# Patient Record
Sex: MALE | Race: ASIAN | NOT HISPANIC OR LATINO
[De-identification: names, ages, dates, MRNs, and addresses within clinical notes are randomized per-mention and may not be internally consistent; named-entity substitution may affect disease eponyms.]

---

## 2019-04-14 ENCOUNTER — APPOINTMENT (OUTPATIENT)
Dept: HUMAN REPRODUCTION | Facility: CLINIC | Age: 34
End: 2019-04-14

## 2019-04-20 ENCOUNTER — APPOINTMENT (OUTPATIENT)
Dept: HUMAN REPRODUCTION | Facility: CLINIC | Age: 34
End: 2019-04-20
Payer: SELF-PAY

## 2019-04-20 PROCEDURE — 89322 SEMEN ANAL STRICT CRITERIA: CPT

## 2021-01-28 ENCOUNTER — APPOINTMENT (OUTPATIENT)
Dept: ORTHOPEDIC SURGERY | Facility: CLINIC | Age: 36
End: 2021-01-28
Payer: MEDICAID

## 2021-01-28 VITALS — WEIGHT: 209 LBS | HEIGHT: 70 IN | BODY MASS INDEX: 29.92 KG/M2

## 2021-01-28 VITALS — SYSTOLIC BLOOD PRESSURE: 133 MMHG | DIASTOLIC BLOOD PRESSURE: 79 MMHG | HEART RATE: 71 BPM

## 2021-01-28 VITALS — TEMPERATURE: 97.2 F

## 2021-01-28 DIAGNOSIS — F17.200 NICOTINE DEPENDENCE, UNSPECIFIED, UNCOMPLICATED: ICD-10-CM

## 2021-01-28 DIAGNOSIS — Z78.9 OTHER SPECIFIED HEALTH STATUS: ICD-10-CM

## 2021-01-28 DIAGNOSIS — Z56.0 UNEMPLOYMENT, UNSPECIFIED: ICD-10-CM

## 2021-01-28 PROCEDURE — 99072 ADDL SUPL MATRL&STAF TM PHE: CPT

## 2021-01-28 PROCEDURE — 99203 OFFICE O/P NEW LOW 30 MIN: CPT

## 2021-01-28 PROCEDURE — 73030 X-RAY EXAM OF SHOULDER: CPT | Mod: RT

## 2021-01-28 SDOH — ECONOMIC STABILITY - INCOME SECURITY: UNEMPLOYMENT, UNSPECIFIED: Z56.0
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
## 2021-01-29 NOTE — DISCUSSION/SUMMARY
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  X-rays taken today show a locked posterior fracture dislocation with avulsion of the lesser tuberosity anteriorly.  CT shows early bridging callus formation at the inferolateral margin with continued posterior shoulder dislocation. Clinically, the shoulder is completely nonfunctional with complete loss of motion/function.  Neurovascularly intact.  I discussed in detail with the patient as well as a friend present who is translating that his injury will likely result in significant long-term disability.  However, given the dislocation, attempts at reduction and fixation of the lesser tuberosity fracture for stabilization is indicated.  \par \par All risks, benefits and alternatives to the proposed surgical procedure, right shoulder open reduction and stabilization with internal fixation lesser tuberosity fixation, as well as the need for formal post-operative rehabilitation were discussed in great detail with the patient. Risks include but are not limited to pain, bleeding, infection, neurovascular injury, stiffness, medical complications (including DVT, PE, MI), and risks of anesthesia. \par \par A discussion was also had with the patient regarding additional risk given recent Covid-19 pandemic; including the potential additional risk of anesthesia and any medical comorbidities that the patient has.  It was made clear to the patient that we are taking all precautions regarding Covid-19 with regards to surgical scheduling and perioperative care.  The patient understands that current protocol requires Covid-19 testing no more than 48hrs prior to surgery, and PST's may be performed onsite at the day of surgery. \par \par Patient questions and concerns were answered. He has elected to proceed and will schedule urgently.

## 2021-01-29 NOTE — PHYSICAL EXAM
[de-identified] : Oriented to time, place, person\par Mood: Normal\par Affect: Normal\par Appearance: Healthy, well appearing, no acute distress.\par Gait: Normal\par Assistive Devices: Sling\par \par RIght shoulder exam:\par \par Inspection: No malalignment, deformity to shoulder consistent with posterior dislocation\par Skin: No masses, No lesions\par Neck: Negative Spurling, full ROM, no pain with ROM\par AROM: Severely limited: FF to 10, abduction to 20, ER to 0\par PROM: No gross passive motion\par Painful arc ROM: throughout\par Tenderness: No bicipital tenderness, positive tenderness to greater tuberosity/RTC insertion, positive anterior shoulder/lesser tuberosity tenderness\par Strength: 2/5 ER, 2/5 IR in adduction\par AC joint: No TTP/pain with cross arm testing\par Vasc: 2+ radial pulse \par Stability: Posterior dislocation\par Neuro: AIN, PIN, Ulnar nerve intact to motor\par Sensation: Intact to light touch throughout  [de-identified] : The following radiographs were ordered and read by me during this patients visit. I reviewed each radiograph in detail with the patient and discussed the findings as highlighted below.\par \par 3 views of right shoulder were obtained today, 01/28/2021, that show locked posterior shoulder dislocation and anterior displaced lesser tuberosity fracture. \par \par CT of right shoulder dated 1.20.2021 shows comminuted fx of the humeral head with early bridging callus formation at the inferolateral margin.  Locked posterior shoulder dislocation with complete disarticulation of the dominant distal fracture fragment.

## 2021-01-29 NOTE — ADDENDUM
[FreeTextEntry1] : This note was written by Maria Eugenia Alexander on 01/28/2021 acting solely as a scribe for Dr. Jerome Mccarthy.\par \par All medical record entries made by the Scribe were at my, Dr. Jerome Mccarthy, direction and personally dictated by me on 01/28/2021. I have personally reviewed the chart and agree that the record accurately reflects my personal performance of the history, physical exam, assessment and plan.

## 2021-02-02 ENCOUNTER — OUTPATIENT (OUTPATIENT)
Dept: OUTPATIENT SERVICES | Facility: HOSPITAL | Age: 36
LOS: 1 days | End: 2021-02-02
Payer: MEDICAID

## 2021-02-02 VITALS
OXYGEN SATURATION: 99 % | WEIGHT: 195.99 LBS | RESPIRATION RATE: 16 BRPM | DIASTOLIC BLOOD PRESSURE: 88 MMHG | TEMPERATURE: 98 F | HEIGHT: 70 IN | HEART RATE: 87 BPM | SYSTOLIC BLOOD PRESSURE: 118 MMHG

## 2021-02-02 DIAGNOSIS — S42.291A OTHER DISPLACED FRACTURE OF UPPER END OF RIGHT HUMERUS, INITIAL ENCOUNTER FOR CLOSED FRACTURE: ICD-10-CM

## 2021-02-02 LAB
ANION GAP SERPL CALC-SCNC: 13 MMOL/L — SIGNIFICANT CHANGE UP (ref 7–14)
BLD GP AB SCN SERPL QL: NEGATIVE — SIGNIFICANT CHANGE UP
BUN SERPL-MCNC: 10 MG/DL — SIGNIFICANT CHANGE UP (ref 7–23)
CALCIUM SERPL-MCNC: 9.7 MG/DL — SIGNIFICANT CHANGE UP (ref 8.4–10.5)
CHLORIDE SERPL-SCNC: 101 MMOL/L — SIGNIFICANT CHANGE UP (ref 98–107)
CO2 SERPL-SCNC: 25 MMOL/L — SIGNIFICANT CHANGE UP (ref 22–31)
CREAT SERPL-MCNC: 0.53 MG/DL — SIGNIFICANT CHANGE UP (ref 0.5–1.3)
GLUCOSE SERPL-MCNC: 87 MG/DL — SIGNIFICANT CHANGE UP (ref 70–99)
HCT VFR BLD CALC: 49.7 % — SIGNIFICANT CHANGE UP (ref 39–50)
HGB BLD-MCNC: 16.1 G/DL — SIGNIFICANT CHANGE UP (ref 13–17)
MCHC RBC-ENTMCNC: 27.1 PG — SIGNIFICANT CHANGE UP (ref 27–34)
MCHC RBC-ENTMCNC: 32.4 GM/DL — SIGNIFICANT CHANGE UP (ref 32–36)
MCV RBC AUTO: 83.7 FL — SIGNIFICANT CHANGE UP (ref 80–100)
NRBC # BLD: 0 /100 WBCS — SIGNIFICANT CHANGE UP
NRBC # FLD: 0 K/UL — SIGNIFICANT CHANGE UP
PLATELET # BLD AUTO: 216 K/UL — SIGNIFICANT CHANGE UP (ref 150–400)
POTASSIUM SERPL-MCNC: 3.6 MMOL/L — SIGNIFICANT CHANGE UP (ref 3.5–5.3)
POTASSIUM SERPL-SCNC: 3.6 MMOL/L — SIGNIFICANT CHANGE UP (ref 3.5–5.3)
RBC # BLD: 5.94 M/UL — HIGH (ref 4.2–5.8)
RBC # FLD: 12.7 % — SIGNIFICANT CHANGE UP (ref 10.3–14.5)
RH IG SCN BLD-IMP: POSITIVE — SIGNIFICANT CHANGE UP
SODIUM SERPL-SCNC: 139 MMOL/L — SIGNIFICANT CHANGE UP (ref 135–145)
WBC # BLD: 7.92 K/UL — SIGNIFICANT CHANGE UP (ref 3.8–10.5)
WBC # FLD AUTO: 7.92 K/UL — SIGNIFICANT CHANGE UP (ref 3.8–10.5)

## 2021-02-02 PROCEDURE — 93010 ELECTROCARDIOGRAM REPORT: CPT
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
## 2021-02-02 NOTE — H&P PST ADULT - NEGATIVE NEUROLOGICAL SYMPTOMS
no transient paralysis/no weakness/no paresthesias/no generalized seizures/no focal seizures/no tremors/no difficulty walking

## 2021-02-02 NOTE — H&P PST ADULT - NSICDXPROBLEM_GEN_ALL_CORE_FT
PROBLEM DIAGNOSES  Problem: Other displaced fracture of upper end of right humerus, initial encounter for closed fracture  Assessment and Plan: Patient tentatively scheduled for right shoulder open reduction internal fixation proximal humerus fracture open reduction shoulder dislocation on 2/9/21.  Pre-op instructions provided. Pt given verbal and written instructions with teach back on chlorhexidine shampoo and pepcid. Pt verbalized understanding with return demonstration.   Covid testing scheduled prior to surgery as per patient.

## 2021-02-02 NOTE — H&P PST ADULT - NEGATIVE GENERAL GENITOURINARY SYMPTOMS
no hematuria/no renal colic/no flank pain L/no flank pain R/no urine discoloration/no bladder infections/no dysuria/no urinary hesitancy/normal urinary frequency

## 2021-02-04 DIAGNOSIS — Z01.818 ENCOUNTER FOR OTHER PREPROCEDURAL EXAMINATION: ICD-10-CM

## 2021-02-05 ENCOUNTER — APPOINTMENT (OUTPATIENT)
Dept: DISASTER EMERGENCY | Facility: CLINIC | Age: 36
End: 2021-02-05

## 2021-02-06 LAB — SARS-COV-2 N GENE NPH QL NAA+PROBE: NOT DETECTED

## 2021-02-08 ENCOUNTER — TRANSCRIPTION ENCOUNTER (OUTPATIENT)
Age: 36
End: 2021-02-08

## 2021-02-08 VITALS
SYSTOLIC BLOOD PRESSURE: 118 MMHG | OXYGEN SATURATION: 99 % | HEIGHT: 70 IN | TEMPERATURE: 98 F | RESPIRATION RATE: 16 BRPM | HEART RATE: 87 BPM | DIASTOLIC BLOOD PRESSURE: 88 MMHG | WEIGHT: 195.99 LBS

## 2021-02-08 RX ORDER — OXYCODONE AND ACETAMINOPHEN 5; 325 MG/1; MG/1
5-325 TABLET ORAL
Qty: 30 | Refills: 0 | Status: ACTIVE | COMMUNITY
Start: 2021-02-08 | End: 1900-01-01

## 2021-02-09 ENCOUNTER — OUTPATIENT (OUTPATIENT)
Dept: OUTPATIENT SERVICES | Facility: HOSPITAL | Age: 36
LOS: 1 days | Discharge: HOME | End: 2021-02-09
Payer: MEDICAID

## 2021-02-09 ENCOUNTER — APPOINTMENT (OUTPATIENT)
Dept: ORTHOPEDIC SURGERY | Facility: AMBULATORY SURGERY CENTER | Age: 36
End: 2021-02-09

## 2021-02-09 VITALS
DIASTOLIC BLOOD PRESSURE: 69 MMHG | HEART RATE: 80 BPM | RESPIRATION RATE: 15 BRPM | SYSTOLIC BLOOD PRESSURE: 114 MMHG | TEMPERATURE: 97 F | OXYGEN SATURATION: 100 %

## 2021-02-09 DIAGNOSIS — S42.291A OTHER DISPLACED FRACTURE OF UPPER END OF RIGHT HUMERUS, INITIAL ENCOUNTER FOR CLOSED FRACTURE: ICD-10-CM

## 2021-02-09 PROCEDURE — 23670 OPTX SHO DISLC FX: CPT | Mod: RT

## 2021-02-09 PROCEDURE — 23430 REPAIR BICEPS TENDON: CPT | Mod: RT

## 2021-02-09 NOTE — ASU DISCHARGE PLAN (ADULT/PEDIATRIC) - CALL YOUR DOCTOR IF YOU HAVE ANY OF THE FOLLOWING:
Bleeding that does not stop/Swelling that gets worse/Pain not relieved by Medications/Fever greater than (need to indicate Fahrenheit or Celsius)/Numbness, tingling, color or temperature change to extremity/Nausea and vomiting that does not stop

## 2021-02-22 ENCOUNTER — APPOINTMENT (OUTPATIENT)
Dept: ORTHOPEDIC SURGERY | Facility: CLINIC | Age: 36
End: 2021-02-22
Payer: MEDICAID

## 2021-02-22 PROCEDURE — 73030 X-RAY EXAM OF SHOULDER: CPT | Mod: RT

## 2021-02-22 PROCEDURE — 99024 POSTOP FOLLOW-UP VISIT: CPT

## 2021-02-22 NOTE — ADDENDUM
[FreeTextEntry1] : This note was written by Maria Eugenia Alexander on 02/22/2021 acting solely as a scribe for Dr. Jerome Mccarthy.\par \par All medical record entries made by the Scribe were at my, Dr. Jerome Mccarthy, direction and personally dictated by me on 02/22/2021. I have personally reviewed the chart and agree that the record accurately reflects my personal performance of the history, physical exam, assessment and plan.

## 2021-02-22 NOTE — HISTORY OF PRESENT ILLNESS
[Clean/Dry/Intact] : clean, dry and intact [Healed] : healed [Neuro Intact] : an unremarkable neurological exam [Vascular Intact] : ~T peripheral vascular exam normal [Doing Well] : is doing well [Excellent Pain Control] : has excellent pain control [No Sign of Infection] : is showing no signs of infection [Sutures Removed] : sutures were removed [Steri-Strips Removed & Replaced] : steri-strips removed and replaced [Chills] : no chills [Fever] : no fever [Nausea] : no nausea [Vomiting] : no vomiting [Erythema] : not erythematous [Discharge] : absent of discharge [Swelling] : not swollen [Dehiscence] : not dehisced [de-identified] : 34 y/o male s/p right shoulder open reduction posterior shoulder dislocation, ORIF of greater tuberosity and  lesser tuberosity, modified Walden procedure, open biceps tenodesis 2.9.2021 [de-identified] : 36 y/o male s/p right shoulder open reduction posterior shoulder dislocation, ORIF of greater tuberosity and  lesser tuberosity, modified Walden procedure, open biceps tenodesis . He is doing well. Presents in brace. He is not taking pain medication. Denies post op complications.  [de-identified] : Right shoulder exam:\par \par Inspection: No significant ecchymosis, no residual swelling\par Skin: Incisions C/D/I, no drainage, healed\par ROM: not tested \par Painful arc ROM: not tested\par Tenderness: Tenderness throughout the shoulder girdle\par Strength: Unable to test\par Vasc: 2+ radial pulse \par Neuro: AIN, PIN, Ulnar nerve intact to motor\par Sensation: Intact to light touch throughout  [de-identified] : The following radiographs were ordered and read by me during this patients visit. I reviewed each radiograph in detail with the patient and discussed the findings as highlighted below.\par \par 2 views of right shoulder were obtained today, 02/22/2021, that show post-operative ORIF greater tuberosity and lesser tuberosity. No hardware failure.  Near concentric reduction right shoulder.  Axillary image not obtained. [de-identified] : 36 y/o male s/p right shoulder open reduction posterior shoulder dislocation, ORIF of greater tuberosity and  lesser tuberosity, modified Walden procedure, open biceps tenodesis \par \par Patient is doing well. All questions were answered regarding surgical procedure as well as immediate post-operative recovery period. We also discussed the post-operative rehabilitation program in great detail.  Consideration is for continued immobilization for an additional 2 weeks prior to beginning passive range of motion with physical therapy.  Will restrict internal rotation for a total of 6 to 8 weeks.\par \par Recommendations:\par 1.  Complete restriction range of motion function right upper extremity x2 weeks 2. Brace: Discussed use of abduction brace and external rotation, removal for hygeine only 3. Meds: Wean pain medication, may transition to Tylenol/NSAID's as tolerated.\par 4. Ice/cryocuff as needed\par 5. Restrictions: As discussed \par \par Followup 2 weeks for repeat clinical evaluation/imaging.

## 2021-02-23 PROBLEM — Z78.9 OTHER SPECIFIED HEALTH STATUS: Chronic | Status: ACTIVE | Noted: 2021-02-02

## 2021-03-08 ENCOUNTER — APPOINTMENT (OUTPATIENT)
Dept: ORTHOPEDIC SURGERY | Facility: CLINIC | Age: 36
End: 2021-03-08

## 2021-03-08 ENCOUNTER — APPOINTMENT (OUTPATIENT)
Dept: ORTHOPEDIC SURGERY | Facility: CLINIC | Age: 36
End: 2021-03-08
Payer: MEDICAID

## 2021-03-08 PROCEDURE — 99024 POSTOP FOLLOW-UP VISIT: CPT

## 2021-03-10 NOTE — HISTORY OF PRESENT ILLNESS
[Clean/Dry/Intact] : clean, dry and intact [Healed] : healed [Neuro Intact] : an unremarkable neurological exam [Vascular Intact] : ~T peripheral vascular exam normal [Doing Well] : is doing well [Excellent Pain Control] : has excellent pain control [No Sign of Infection] : is showing no signs of infection [Sutures Removed] : sutures were removed [Steri-Strips Removed & Replaced] : steri-strips removed and replaced [Chills] : no chills [Fever] : no fever [Nausea] : no nausea [Vomiting] : no vomiting [Erythema] : not erythematous [Discharge] : absent of discharge [Swelling] : not swollen [Dehiscence] : not dehisced [de-identified] : 34 y/o male s/p right shoulder open reduction posterior shoulder dislocation, ORIF of greater tuberosity and  lesser tuberosity, modified Walden procedure, open biceps tenodesis 2.9.2021 [de-identified] : 34 y/o male s/p right shoulder open reduction posterior shoulder dislocation, ORIF of greater tuberosity and  lesser tuberosity, modified Walden procedure, open biceps tenodesis . He is doing well. Presents in brace. He is not taking pain medication. Denies post op complications.  [de-identified] : Right shoulder exam:\par \par Inspection: No significant ecchymosis, no residual swelling\par Skin: Incisions C/D/I, no drainage, healed\par ROM: not tested \par Painful arc ROM: not tested\par Tenderness: Tenderness throughout the shoulder girdle\par Strength: Unable to test\par Vasc: 2+ radial pulse \par Neuro: AIN, PIN, Ulnar nerve intact to motor\par Sensation: Intact to light touch throughout  [de-identified] : The following radiographs were ordered and read by me during this patients visit. I reviewed each radiograph in detail with the patient and discussed the findings as highlighted below.\par \par 2 views of right shoulder were obtained today, 02/22/2021, that show post-operative ORIF greater tuberosity and lesser tuberosity. No hardware failure.  Near concentric reduction right shoulder.  Axillary image not obtained.

## 2021-04-05 ENCOUNTER — APPOINTMENT (OUTPATIENT)
Dept: ORTHOPEDIC SURGERY | Facility: CLINIC | Age: 36
End: 2021-04-05
Payer: MEDICAID

## 2021-04-05 PROCEDURE — 99024 POSTOP FOLLOW-UP VISIT: CPT

## 2021-04-05 PROCEDURE — 73030 X-RAY EXAM OF SHOULDER: CPT | Mod: RT

## 2021-04-12 NOTE — HISTORY OF PRESENT ILLNESS
[0] : no pain reported [Clean/Dry/Intact] : clean, dry and intact [Healed] : healed [Neuro Intact] : an unremarkable neurological exam [Vascular Intact] : ~T peripheral vascular exam normal [Doing Well] : is doing well [Excellent Pain Control] : has excellent pain control [No Sign of Infection] : is showing no signs of infection [Chills] : no chills [Fever] : no fever [Nausea] : no nausea [Vomiting] : no vomiting [Erythema] : not erythematous [Discharge] : absent of discharge [Swelling] : not swollen [Dehiscence] : not dehisced [de-identified] : 34 y/o male s/p right shoulder open reduction posterior shoulder dislocation, ORIF of greater tuberosity and  lesser tuberosity, modified Walden procedure, open biceps tenodesis 2.9.2021 [de-identified] : 34 y/o male s/p right shoulder open reduction posterior shoulder dislocation, ORIF of greater tuberosity and  lesser tuberosity, modified Walden procedure, open biceps tenodesis . He is doing well. Attending PT 2 x per week.  Presents in brace. He is not taking pain medication. Denies post op complications. Reports stiffness. [de-identified] : Right shoulder exam:\par \par Inspection: No ecchymosis, no swelling\par Skin: Incisions C/D/I, no drainage, healed\par Passive ROM: 10 ER, 50 abd, 70 FF\par Painful arc ROM: extremes of motion\par Tenderness: Tenderness throughout the shoulder girdle with ROM\par Strength: Not tested\par Vasc: 2+ radial pulse \par Neuro: AIN, PIN, Ulnar nerve intact to motor\par Sensation: Intact to light touch throughout  [de-identified] : The following radiographs were ordered and read by me during this patients visit. I reviewed each radiograph in detail with the patient and discussed the findings as highlighted below.\par \par 2 views of right shoulder were obtained today, 04/05/2021, that show post-operative ORIF greater tuberosity and lesser tuberosity. No hardware failure.  Concentric reduction right shoulder. [de-identified] : 34 y/o male s/p right shoulder open reduction posterior shoulder dislocation, ORIF of greater tuberosity and  lesser tuberosity, modified Walden procedure, open biceps tenodesis. \par \par Patient is doing well at this time following surgical intervention. Denies any immediate postoperative complications. Clinically, patient is stiff which is expected due to the severity of the injury. Progressing well with physical therapy and discussed need for aggressive mobilization at this time with expected limitations of function of the shoulder. Discussion was had with the patient/family member regarding the next phase of physical therapy as instructed. \par \par Recommendations:\par 1. Continue PT treatment as per protocol (Updated Rx given). HEP as instructed.\par 2. Brace: Discontinue brace.\par 3. Meds: Tylenol/NSAID's as tolerated.\par 4. Ice as needed after activity/HEP\par 5. Restrictions: None\par \par Followup 8 weeks for repeat clinical evaluation.

## 2021-04-12 NOTE — ADDENDUM
[FreeTextEntry1] : This note was written by Maria Eugenia Alexander on 04/05/2021 acting solely as a scribe for Dr. Jerome Mccarthy.\par \par All medical record entries made by the Scribe were at my, Dr. Jerome Mccarthy, direction and personally dictated by me on 04/05/2021. I have personally reviewed the chart and agree that the record accurately reflects my personal performance of the history, physical exam, assessment and plan.

## 2021-05-18 ENCOUNTER — APPOINTMENT (OUTPATIENT)
Dept: ORTHOPEDIC SURGERY | Facility: CLINIC | Age: 36
End: 2021-05-18
Payer: MEDICAID

## 2021-05-18 PROCEDURE — 99213 OFFICE O/P EST LOW 20 MIN: CPT

## 2021-05-20 NOTE — PHYSICAL EXAM
[de-identified] : Oriented to time, place, person\par Mood: Normal\par Affect: Normal\par Appearance: Healthy, well appearing, no acute distress.\par Gait: Normal\par Assistive Devices: None \par \par Right shoulder exam:\par \par Inspection: No ecchymosis, no swelling\par Skin: Incisions C/D/I, no drainage, healed\par AROM: Minimal\par Passive ROM: 10 ER, 65 abd, 90 FF\par Painful arc ROM: None\par Tenderness: none\par Strength: Weak external rotation\par Vasc: 2+ radial pulse \par Neuro: AIN, PIN, Ulnar nerve intact to motor\par Sensation: Intact to light touch throughout.  [de-identified] : The following radiographs were ordered and read by me during this patients visit. I reviewed each radiograph in detail with the patient and discussed the findings as highlighted below.\par \par 2 views of right shoulder were obtained today, 05/18/2021, that show post-operative ORIF greater tuberosity and lesser tuberosity. No hardware failure. Concentric reduction right shoulder.

## 2021-05-20 NOTE — ADDENDUM
[FreeTextEntry1] : This note was written by Maria Eugenia Alexander on 05/18/2021 acting solely as a scribe for Dr. Jerome Mccarthy.\par \par All medical record entries made by the Scribe were at my, Dr. Jerome Mccarthy, direction and personally dictated by me on 05/18/2021. I have personally reviewed the chart and agree that the record accurately reflects my personal performance of the history, physical exam, assessment and plan.

## 2021-05-20 NOTE — DISCUSSION/SUMMARY
[de-identified] : 35 y/o male s/p right shoulder open reduction posterior shoulder dislocation, ORIF of greater tuberosity and lesser tuberosity, modified Walden procedure, open biceps tenodesis. \par \par Patient is continuing to improve in terms of pain management of the right shoulder, however he has difficulty with active range of motion of the right shoulder.  Passive range of motion is to approximately 90 degrees forward flexion which I discussed would be adequate following this injury.  Recommendations are for aggressive PT for improvement of active ROM as well as strengthening and conditioning. \par \par Recommendations: Continue aggressive PT. NSAIDs/Ice as needed. \par \par Follow-up 2 months

## 2021-05-20 NOTE — HISTORY OF PRESENT ILLNESS
[de-identified] : 35 y/o male s/p right shoulder open reduction posterior shoulder dislocation, ORIF of greater tuberosity and lesser tuberosity, modified Walden procedure, open biceps tenodesis 2.9.21. He is doing well, with improved pain and function, but continues to have limited active motion. Attending PT 2 x per week.He is not taking pain medication. Denies post op complications. Rates his pain 0/10. He is not taking pain medication.

## 2021-07-29 ENCOUNTER — APPOINTMENT (OUTPATIENT)
Dept: ORTHOPEDIC SURGERY | Facility: CLINIC | Age: 36
End: 2021-07-29
Payer: MEDICAID

## 2021-07-29 DIAGNOSIS — Z00.00 ENCOUNTER FOR GENERAL ADULT MEDICAL EXAMINATION W/OUT ABNORMAL FINDINGS: ICD-10-CM

## 2021-07-29 DIAGNOSIS — S43.021A POSTERIOR SUBLUXATION OF RIGHT HUMERUS, INITIAL ENCOUNTER: ICD-10-CM

## 2021-07-29 PROCEDURE — 99213 OFFICE O/P EST LOW 20 MIN: CPT

## 2021-07-29 PROCEDURE — 73030 X-RAY EXAM OF SHOULDER: CPT | Mod: RT

## 2021-07-30 PROBLEM — S43.021A: Status: ACTIVE | Noted: 2021-01-29

## 2021-07-30 PROBLEM — Z00.00 ENCOUNTER FOR PREVENTIVE HEALTH EXAMINATION: Status: ACTIVE | Noted: 2019-04-13

## 2021-07-30 NOTE — DISCUSSION/SUMMARY
[de-identified] : 37 y/o male s/p right shoulder open reduction posterior shoulder dislocation, ORIF of greater tuberosity and lesser tuberosity, modified Walden procedure, open biceps tenodesis. \par \par Patient is continuing to improve in terms of pain management of the right shoulder, however he has continued difficulty with active range of motion of the right shoulder.  Active range of motion is to approximately 40 degrees forward flexion.  There is concerned given the degree of injury that his rotator cuff is not functioning naturally and well.  This was discussed with the patient.  We discussed continued limitations of physical therapy given initial injury patten, and that persistent physical therapy may ultimately provide a functional shoulder.  \par \par Recommendations: Continue aggressive PT, new Rx given. NSAIDs/Ice as needed. \par \par Follow-up 2 months for consideration of advanced imaging if continued rotator cuff dysfunction.

## 2021-07-30 NOTE — ADDENDUM
[FreeTextEntry1] : This note was written by Maria Eugenia Alexander on 07/29/2021 acting solely as a scribe for Dr. Jerome Mccarthy.\par \par All medical record entries made by the Scribe were at my, Dr. Jerome Mccarthy, direction and personally dictated by me on 07/29/2021. I have personally reviewed the chart and agree that the record accurately reflects my personal performance of the history, physical exam, assessment and plan.

## 2021-07-30 NOTE — PHYSICAL EXAM
[de-identified] : Oriented to time, place, person\par Mood: Normal\par Affect: Normal\par Appearance: Healthy, well appearing, no acute distress.\par Gait: Normal\par Assistive Devices: None \par \par Right shoulder exam:\par \par Inspection: No ecchymosis, no swelling, no deformity\par Skin: Incisions C/D/I, no drainage, healed\par AROM: 10 ER, 40 abd, 50 FF\par PROM: 110 FF, 60 abd, 15 ER\par Painful arc ROM: None\par Tenderness: none\par Strength: 3/5 ER, 5/5 IR, +drop arm test\par Vasc: 2+ radial pulse \par Neuro: AIN, PIN, Ulnar nerve intact to motor\par Sensation: Intact to light touch throughout.  [de-identified] : The following radiographs were ordered and read by me during this patients visit. I reviewed each radiograph in detail with the patient and discussed the findings as highlighted below.\par \par 2 views of right shoulder were obtained today, 07/18/2021, that show post-operative ORIF greater tuberosity and lesser tuberosity. No hardware failure. Concentric reduction right shoulder.

## 2021-07-30 NOTE — HISTORY OF PRESENT ILLNESS
[de-identified] : 35 y/o male s/p right shoulder open reduction posterior shoulder dislocation, ORIF of greater tuberosity and lesser tuberosity, modified Walden procedure, open biceps tenodesis 2.9.21. He is doing well, with improved pain, but continues to have limited active motion. Attending PT 2 x per week. He is not taking pain medication. Denies post op complications. Rates his pain 0/10. He is not taking pain medication.

## 2021-11-01 ENCOUNTER — APPOINTMENT (OUTPATIENT)
Dept: ORTHOPEDIC SURGERY | Facility: CLINIC | Age: 36
End: 2021-11-01
Payer: MEDICAID

## 2021-11-01 VITALS — HEIGHT: 70 IN | BODY MASS INDEX: 29.92 KG/M2 | WEIGHT: 209 LBS

## 2021-11-01 DIAGNOSIS — S42.291D OTHER DISPLACED FRACTURE OF UPPER END OF RIGHT HUMERUS, SUBSEQUENT ENCOUNTER FOR FRACTURE WITH ROUTINE HEALING: ICD-10-CM

## 2021-11-01 PROCEDURE — 99213 OFFICE O/P EST LOW 20 MIN: CPT

## 2021-11-02 PROBLEM — S42.291D OTHER CLOSED DISPLACED FRACTURE OF PROXIMAL END OF RIGHT HUMERUS WITH ROUTINE HEALING, SUBSEQUENT ENCOUNTER: Status: ACTIVE | Noted: 2021-01-29

## 2021-11-02 NOTE — PHYSICAL EXAM
[de-identified] : Oriented to time, place, person\par Mood: Normal\par Affect: Normal\par Appearance: Healthy, well appearing, no acute distress.\par Gait: Normal\par Assistive Devices: None \par \par Right shoulder exam:\par \par Inspection: No ecchymosis, no swelling, no deformity\par Skin: Incisions C/D/I, no drainage, healed\par AROM: 80 FF, 70 abd, 0 ER, IR to mid lumbar\par PROM: 120 FF, 60 abd, 15 ER\par Painful arc ROM: None\par Tenderness: none\par Strength: 2/5 ER, 5/5 IR, +drop arm test\par Vasc: 2+ radial pulse \par Neuro: AIN, PIN, Ulnar nerve intact to motor\par Sensation: Intact to light touch throughout.  [de-identified] : 2 views of right shoulder were obtained 07/18/2021, that show post-operative ORIF greater tuberosity and lesser tuberosity. No hardware failure. Concentric reduction right shoulder.

## 2021-11-02 NOTE — HISTORY OF PRESENT ILLNESS
[de-identified] : 35 y/o male s/p right shoulder open reduction posterior shoulder dislocation, ORIF of greater tuberosity and lesser tuberosity, modified Walden procedure, open biceps tenodesis 2.9.21. He discontinued PT 3 months ago due to lack of insurance authorization and continues to struggle with external rotation/forward flexion consistent with rotator cuff deficiency.  We have previously discussed concern for long-term dysfunction of the shoulder, but he is concerned regarding lack of function.  He is not taking pain medication. Denies post op complications.

## 2021-11-02 NOTE — DISCUSSION/SUMMARY
[de-identified] : 37 y/o male s/p right shoulder open reduction posterior shoulder dislocation, ORIF of greater tuberosity and lesser tuberosity, modified Walden procedure, open biceps tenodesis. \par \par Patient has continued difficulty with active range of motion of the right shoulder including external rotation and forward flexion.  Consistent with rotator cuff deficiency.  We discussed degree of internal derangement at time of procedure, and likely continued rotator cuff deficiency through the posterior shoulder. He has also not been able to continue with PT due to lack of insurance authorization. \par \par MRI Rx given to patient to further delineate condition of rotator cuff and any posterior cuff deficiency. This will allow for better understanding of the patient's condition and allow for better stratification of additional treatment strategies (surgical vs. non-surgical). Patient is agreeable to further imaging. \par \par Recommendations: Continued home exercise program.  Activity to tolerance.\par \par Followup: After MRI

## 2021-11-02 NOTE — ADDENDUM
[FreeTextEntry1] : This note was written by Maria Eugenia Alexander on 11/01/2021 acting solely as a scribe for Dr. Jerome Mccarthy.\par \par All medical record entries made by the Scribe were at my, Dr. Jerome Mccarthy, direction and personally dictated by me on 11/01/2021. I have personally reviewed the chart and agree that the record accurately reflects my personal performance of the history, physical exam, assessment and plan.

## 2024-02-23 NOTE — ASU PREOPERATIVE ASSESSMENT, ADULT ([facility] ONLY) - BP NONINVASIVE DIASTOLIC (MM HG)
Has Your Condition Been Treated Before?: has not been treated with cryotherapy before
Additional History: The patient also reports a few scaly lesions on his forehead/scalp that he would like evaluated today.
Body Location Override (Optional): right superior preauricular cheek
88

## 2024-03-25 ENCOUNTER — APPOINTMENT (OUTPATIENT)
Dept: HUMAN REPRODUCTION | Facility: CLINIC | Age: 39
End: 2024-03-25
Payer: SELF-PAY

## 2024-03-25 PROCEDURE — 89322 SEMEN ANAL STRICT CRITERIA: CPT

## 2024-10-01 ENCOUNTER — APPOINTMENT (OUTPATIENT)
Dept: HUMAN REPRODUCTION | Facility: CLINIC | Age: 39
End: 2024-10-01
Payer: SELF-PAY

## 2024-10-01 PROCEDURE — 36415 COLL VENOUS BLD VENIPUNCTURE: CPT

## 2024-10-07 ENCOUNTER — APPOINTMENT (OUTPATIENT)
Dept: HUMAN REPRODUCTION | Facility: CLINIC | Age: 39
End: 2024-10-07
Payer: SELF-PAY

## 2024-10-07 PROCEDURE — 89322 SEMEN ANAL STRICT CRITERIA: CPT
